# Patient Record
Sex: MALE | Race: WHITE | URBAN - METROPOLITAN AREA
[De-identification: names, ages, dates, MRNs, and addresses within clinical notes are randomized per-mention and may not be internally consistent; named-entity substitution may affect disease eponyms.]

---

## 2017-06-16 ENCOUNTER — IMPORTED ENCOUNTER (OUTPATIENT)
Dept: URBAN - METROPOLITAN AREA CLINIC 1 | Facility: CLINIC | Age: 82
End: 2017-06-16

## 2017-06-16 PROBLEM — H02.423: Noted: 2017-06-16

## 2017-06-16 PROBLEM — Z79.4: Noted: 2017-06-16

## 2017-06-16 PROBLEM — H04.123: Noted: 2017-06-16

## 2017-06-16 PROBLEM — H43.22: Noted: 2017-06-16

## 2017-06-16 PROBLEM — E11.9: Noted: 2017-06-16

## 2017-06-16 PROBLEM — H16.142: Noted: 2017-06-16

## 2017-06-16 PROBLEM — Z96.1: Noted: 2017-06-16

## 2017-06-16 PROCEDURE — 92014 COMPRE OPH EXAM EST PT 1/>: CPT

## 2017-06-16 NOTE — PATIENT DISCUSSION
1.  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 Discussed the pathophysiology of diabetes and its effect on the eye and risk of blindness. Stressed the importance of strong glucose control. Advised of importance of at least yearly dilated examinations but to contact us immediately for any problems or concerns. 2. Type II Insulin dependent diabetes mellitus3. Dry Eyes OU w/ PEK OS- Stable. The continuation of artificial tears were recommended. 4.  Myogenic Ptosis OU- Observe. Will continue to observe at this time. 5.  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 Pseudophakia OU 8. Return for an appointment for a 10/check K in 6 months with Dr. Sharron Sandoval.

## 2017-12-08 ENCOUNTER — IMPORTED ENCOUNTER (OUTPATIENT)
Dept: URBAN - METROPOLITAN AREA CLINIC 1 | Facility: CLINIC | Age: 82
End: 2017-12-08

## 2017-12-08 PROBLEM — H16.142: Noted: 2017-12-08

## 2017-12-08 PROBLEM — H04.123: Noted: 2017-12-08

## 2017-12-08 PROBLEM — H02.423: Noted: 2017-12-08

## 2017-12-08 PROCEDURE — 92012 INTRM OPH EXAM EST PATIENT: CPT

## 2017-12-08 NOTE — PATIENT DISCUSSION
1.  Dry Eyes OU w/ PEK OS- Stable. The continuation of artificial tears OU TID were recommended. 2.  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 3.  H/o HSV OD w/ failed PKP- Condition discussed w/ pt. Continue Valtrex 500mg PO QD.4. Pseudophakia OU ([place] OD)- Doing well. 5.  H/o DM Type II without DR OU6. H/o Asteroid Hyalosis OS7. Return for an appointment for a 30 in 6 months with Dr. Manfred Parada.

## 2018-06-15 ENCOUNTER — IMPORTED ENCOUNTER (OUTPATIENT)
Dept: URBAN - METROPOLITAN AREA CLINIC 1 | Facility: CLINIC | Age: 83
End: 2018-06-15

## 2018-06-15 PROBLEM — E11.9: Noted: 2018-06-15

## 2018-06-15 PROBLEM — Z79.4: Noted: 2018-06-15

## 2018-06-15 PROCEDURE — 92014 COMPRE OPH EXAM EST PT 1/>: CPT

## 2018-06-15 NOTE — PATIENT DISCUSSION
1. 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 Stressed the importance of strong glucose control. Advised of importance of at least yearly dilated examinations but to contact us immediately for any problems or concerns. 2. Dry Eyes OU w/ PEK OS- Cont ATs TID OU Routinely. 3.  Myogenic Ptosis OU- Observe. Will continue to observe at this time. 4.  Asteroid Hyalosis OS- stable Observe 5. H/o HSV OD w/ Failed PKP- Continue Valtrex 500mg PO QD. (Refill for Valtrex manually faxed to [facility] today) 6. Pseudophakia OU Letter to PCP Return for an appointment in 6 mo 10 with Dr. Nikki Cochran.

## 2018-12-14 ENCOUNTER — IMPORTED ENCOUNTER (OUTPATIENT)
Dept: URBAN - METROPOLITAN AREA CLINIC 1 | Facility: CLINIC | Age: 83
End: 2018-12-14

## 2018-12-14 PROBLEM — H16.141: Noted: 2018-12-14

## 2018-12-14 PROBLEM — H16.401: Noted: 2018-12-14

## 2018-12-14 PROBLEM — B00.52: Noted: 2018-12-14

## 2018-12-14 PROBLEM — Z96.1: Noted: 2018-12-14

## 2018-12-14 PROBLEM — H04.123: Noted: 2018-12-14

## 2018-12-14 PROBLEM — H02.423: Noted: 2018-12-14

## 2018-12-14 PROCEDURE — 99213 OFFICE O/P EST LOW 20 MIN: CPT

## 2018-12-14 NOTE — PATIENT DISCUSSION
1.  HSV OD w/ Failed PKP and advanced CNV- Quiet. Continue Valtrex 500mg PO QD. 2.  Dry Eyes OU w/ PEK OD- Improved. The continuation of artificial tears OU BID were recommended. 3.  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 4.  Pseudophakia OU- Doing well.  5.  H/o DM w/o DR GONSALES6. Return for an appointment for 30 in 6 months with Dr. Sobia Mckeon.

## 2022-04-02 ASSESSMENT — VISUAL ACUITY
OS_PH: SC 20/30 +2
OS_CC: 20/25
OS_CC: 20/20-1
OS_CC: 20/40
OS_CC: 20/25

## 2022-04-02 ASSESSMENT — TONOMETRY
OS_IOP_MMHG: 15
OS_IOP_MMHG: 13
OD_IOP_MMHG: 15
OD_IOP_MMHG: 14
OD_IOP_MMHG: 25
OD_IOP_MMHG: 15
OS_IOP_MMHG: 14
OS_IOP_MMHG: 14